# Patient Record
Sex: FEMALE | Race: WHITE | NOT HISPANIC OR LATINO | Employment: FULL TIME | URBAN - METROPOLITAN AREA
[De-identification: names, ages, dates, MRNs, and addresses within clinical notes are randomized per-mention and may not be internally consistent; named-entity substitution may affect disease eponyms.]

---

## 2022-11-10 ENCOUNTER — OFFICE VISIT (OUTPATIENT)
Dept: FAMILY MEDICINE | Facility: CLINIC | Age: 22
End: 2022-11-10

## 2022-11-10 VITALS
HEIGHT: 72 IN | OXYGEN SATURATION: 98 % | RESPIRATION RATE: 17 BRPM | WEIGHT: 176 LBS | SYSTOLIC BLOOD PRESSURE: 126 MMHG | HEART RATE: 84 BPM | DIASTOLIC BLOOD PRESSURE: 70 MMHG | TEMPERATURE: 98.2 F | BODY MASS INDEX: 23.84 KG/M2

## 2022-11-10 DIAGNOSIS — T78.40XA ALLERGIC REACTION, INITIAL ENCOUNTER: Primary | ICD-10-CM

## 2022-11-10 RX ORDER — EPINEPHRINE 0.3 MG/.3ML
0.3 INJECTION SUBCUTANEOUS PRN
Qty: 2 EACH | Refills: 0 | Status: SHIPPED | OUTPATIENT
Start: 2022-11-10

## 2022-11-10 ASSESSMENT — PAIN SCALES - GENERAL: PAINLEVEL: NO PAIN (0)

## 2022-11-11 NOTE — PATIENT INSTRUCTIONS
Patient Visit Summary    Patient Name: Fabby Cazares  MRN: [id number]    Date of Visit: 11/10/2022    Principle Diagnosis: Urticaria    Physician's Recommendations/Instructions:   1) Discontinue supergreens.   2) Continue taking over the counter medications as needed when symptoms arise, including Benedryl and Zyrtec.  3) Increase PPE use at work when possible; frequently change gloves and wear a mask.   4) Be mindful and use good judgement when using recreational drugs.     Lab Tests Performed:    Follow Up/Results:     Referrals and Instructions: Follow up with [facility] for allergy testing. Coming back for EpiPen    Physician: Dr. Gallardo

## 2022-11-11 NOTE — NURSING NOTE
"[facility] Nursing Progress Note    Primary Concern: Allergies      Pt presents to clinic with concerns about a rash that she began developing on October 16th. She does endorse lifelong allergy to pet dander that is exacerbated by her job as a . Her allergies are normally controlled by Benadryl & Zyrtec, but that changed on the 16th when she began supplementing her diet with \"Super Greens\". Since then, she has been developing a swollen, red, itchy rash on her face, arms, & the back of her neck. Her lips have swelled on two separate occassions and she experienced jaw swelling on 11/9. She believes the histamines in the \"Super Greens\" powder are counteracting her antihistamine medication.  Rash is not present today because she dc'd use of the \"Super Greens\" supplement (Last use 11/9 6am). She is interested in allergy testing to determine appropriate next steps.       Objective:  /70 (BP Location: Left arm, Patient Position: Sitting, Cuff Size: Adult Regular)   Pulse 84   Temp 98.2  F (36.8  C) (Temporal)   Resp 17   Ht 1.829 m (6')   Wt 79.8 kg (176 lb)   SpO2 98%   BMI 23.87 kg/m        Social History:  Occupation   Physical Activity/Lifestyle Active    PHQ Score:    PHQ2: 2    PHQ9: N/A    Nutrition Screener:    Q1 Answer: Often true    Q2 Answer: Often true    Referral to Nutrition needed?: Yes      Nursing Clinician: HELIO Magana  Nursing Preceptor: This patient visit was presented to the preceptors, and the plan of care was agreed upon by the team.    _____________________________  Preceptor Use Only:  In supervising the student, I have reviewed and verified the student's documentation and found it to be correct and complete.   Preceptor Signature: This patient visit was presented to the preceptors, and the plan of care was agreed upon by the team.  "

## 2022-11-11 NOTE — PROGRESS NOTES
MEDICINE NOTE    SUBJECTIVE:  Fabby Cazares is a 22 year old female with a history of pet dander allergy who presents today for evaluation of hives. Patient reports onset 3.5 weeks ago (10/16/22) of persistent, moderately pruritic, burning, painful, tingling, progressively worsening full body urticaria with associated swelling especially around her wrists. This occurred after she started taking a new Supergreens powder with added whole banana, spinach, and kale. Of note, patient has eaten the whole produce previously without issue. The rash continued to worsen and 2 days ago she noticed lip swelling and jaw swelling. Tried Benadryl, Zyrtec, and Hydrocortisone cream without relief of symptoms. She stopped taking the Supergreens 2 days ago and her symptoms have moderately improved. She states she believes the histamines in the spinach and kale are counteracting the Zyrtec and Benadryl she is taking.    Of note, the patient has a pet dander allergy and works as a . She takes Benedryl and Zyrtec PRN and has been able to manage her allergies. She also notes that after having Covid 3 years ago, she has noticed new food allergies such as pineapple and shrimp, with lip swelling. Patient reports a history of hand swelling triggered by rings.     Denies any dyspnea, wheezing, fever, chills, abdominal pain, jaundice, temperature triggered urticaria, lightheadedness, or joint pain. Denies any new soaps or detergents.    REVIEW OF SYSTEMS:  Gen: no fevers, chills  Ears, Noses, Mouth, Throat:throat clear  Cardiac: no chest pain  Lungs: no dyspnea, cough, or shortness of breath  GI: no abdominal pain  : no change in urine, hematuria, or sexual dysfunction  Musculoskeletal: no joint or muscle pain or swelling   Skin:urticaria. no concerning lesions or moles  Neuro: Tingling. No loss of sensation.  Psych: No sleep disturbance    Allergies  Pet  dander  Shrimp  Pineapple    Meds:  Benadryl  Cetirizine  Hydrocortisone  Acetaminophen    Medical History:  Urticaria secondary to pet dander allergy    Surgical History:  Breast cyst removal    Family History  Patient denies any relevant family history.    Social History     Socioeconomic History    Marital status: Not on file     Spouse name: Not on file    Number of children: Not on file    Years of education: Not on file    Highest education level: Not on file   Occupational History    Not on file   Tobacco Use    Smoking status: Not on file    Smokeless tobacco: Not on file   Substance and Sexual Activity    Alcohol use: Not on file    Drug use: Not on file    Sexual activity: Not on file   Other Topics Concern    Not on file   Social History Narrative    Not on file     Social Determinants of Health     Financial Resource Strain: Not on file   Food Insecurity: Positive screen, provided nutrition resources   Transportation Needs: Not on file   Physical Activity: Not on file   Stress: Not on file   Social Connections: Not on file   Intimate Partner Violence: Not on file   Housing Stability: Not on file       OBJECTIVE:  Physical Exam:  /70 (BP Location: Left arm, Patient Position: Sitting, Cuff Size: Adult Regular)   Pulse 84   Temp 98.2  F (36.8  C) (Temporal)   Resp 17   Ht 1.829 m (6')   Wt 79.8 kg (176 lb)   SpO2 98%   BMI 23.87 kg/m    Constitutional: no distress, comfortable, pleasant   Eyes: anicteric, normal extra-ocular movements   Ears, Nose and Throat: Carotid pulses intact with no bruits.  Cardiovascular: regular rate and rhythm, normal S1 and S2, no murmurs, rubs or gallops  Respiratory: clear to auscultation, no wheezes or crackles, normal breath sounds   Skin: mild urticaria on the left neck. no concerning lesions, no jaundice   Psychological: appropriate mood   Lymphatic: no cervical lymphadenopathy    ASSESSMENT/PLAN:  Fabby Cazares is a 22 year old female who presents today for  onset 3.5 weeks ago of persistent, pruritic, burning, painful, worsening urticaria after adding Superfoods supplement to her diet. No other new environmental exposures. Urticaria subsided with cessation of Superfoods supplement.    Counseled patient to:  1. Stop Superfoods supplement  2. Continue Benadryl and Zyrtec as needed.     Plan  1. Provided patient with EpiPen for any potential acute anaphylaxis reaction.  2. Provided referral to [facility] for immunologist appointment.  3.  Provided mental health resources and nutrition grocery box.   4. Counseled patient on potential drug interactions with recreational drug use. Patient voiced understanding.      Fabby was seen today for allergies.    Diagnoses and all orders for this visit:    Allergic reaction, initial encounter  -     EPINEPHrine (ANY BX GENERIC EQUIV) 0.3 MG/0.3ML injection 2-pack; Inject 0.3 mLs (0.3 mg) into the muscle as needed for anaphylaxis May repeat one time in 5-15 minutes if response to initial dose is inadequate.        Med Clinician: Ernestine Guaman    I am signing this for the preceptor who has been unable to sign this note in a timely manner.  The content of this note has been reviewed by the preceptor for accuracy.  I did not participate in the care of this patient.  Mitch Kelley MD - Medical Director, [facility]

## 2022-11-11 NOTE — PROGRESS NOTES
[facility] Progress Note    Chief complaint: Hives and allergic rash over the past 2-3 weeks.     Last date of full med: 10/11/22     Subjective:  Debora 22 year old female presents to clinic with reoccurring allergic reaction over the past 2 to 3 weeks. The patient reports animal dander allergy has been worsening over the past several weeks while taking a new 'Supergreens' supplement in her daily smoothies.  reports experiencing itchy and runny eyes, with hives worsening throughout her body, throughout the day. Recently she has been experiencing facial swelling including neck and lips.  reports worsening allergic symptoms when consuming banana's, pineapple, shrimp, and supplement 'Supergreens' which includes kale, spinach, bananas, other fruits and vegetables. The patient reports working as a , therefore, she is exposed to allergins throughout her day-to-day life. She reports that her allergies have increasingly gotten worse and is worried about a potential severe reaction.The patient reports a recent decrease in caffeine use, no tobacco or alcohol use, and reports using social drugs from time to time, not specifying. Patient is up to date on her vaccines and reports no other adverse reactions in the past. Patients recent  Worsening of symptoms resolved with discontinuation of supplement.      Current Outpatient Medications   Medication Sig Dispense Refill     EPINEPHrine (ANY BX GENERIC EQUIV) 0.3 MG/0.3ML injection 2-pack Inject 0.3 mLs (0.3 mg) into the muscle as needed for anaphylaxis May repeat one time in 5-15 minutes if response to initial dose is inadequate. 2 each 0         Objective:  /70 (BP Location: Left arm, Patient Position: Sitting, Cuff Size: Adult Regular)   Pulse 84   Temp 98.2  F (36.8  C) (Temporal)   Resp 17   Ht 1.829 m (6')   Wt 79.8 kg (176 lb)   SpO2 98%   BMI 23.87 kg/m       Skin:  One 3 cm diameter area on posterior right neck.  Erythematous with  several raised areas within.    Lungs:  Clear.  No wheezing. No stridor.      Assessment:     Allergic reaction: uncontrolled  DTP: Needs Additional Therapy: untreated condition   Rationale:  is not currently experiencing an allergic reaction, however, mentions worsening allergy symptoms that she attributes to taking a new 'Supergreens' supplement.      Plan:  1. Initiate Epipen as needed for emergent allergic situations.  2. Discontinue 'Supergreens' supplement.   3. Continue OTC benadryl as needed and cetirizine daily for allergies.  4. Educate patient on beneficial ways to avoid triggers and irritants. Including wearing a facemask and gloves at work as well as avoiding food triggers.   5. Educate patient on proper Epipen use.    Follow-Up:  Educated patient to follow-up with clinic if experiencing an adverse reactions to medications or new allergins and to monitor allergic reactions for safety.    Pharmacy Clinician: Lotus Lyons   Pharmacy Preceptor: Yanet Quinteros    _____________________________  Preceptor Use Only:  In supervising the medical student, Lotus Lyons, I have reviewed and verified the student's documentation and found it to be correct and complete.   Preceptor Signature: Hao Gallardo MD

## 2022-12-05 NOTE — PROCEDURES
Fabby Cazares is a 22 year old female presents today for new nutrition consultation.    Vitals:  /70 (BP Location: Left arm, Patient Position: Sitting, Cuff Size: Adult Regular)   Pulse 84   Temp 98.2  F (36.8  C) (Temporal)   Resp 17   Ht 1.829 m (6')   Wt 79.8 kg (176 lb)   SpO2 98%   BMI 23.87 kg/m    Estimated body mass index is 23.87 kg/m  as calculated from the following:    Height as of this encounter: 1.829 m (6').    Weight as of this encounter: 79.8 kg (176 lb).   Last 3 BP:   BP Readings from Last 3 Encounters:   11/10/22 126/70     History   Smoking Status     Not on file   Smokeless Tobacco     Not on file       Nutrition History  Patient is on a {1/2/3/4+:884940} diet at home.  Recall:  B: ***  L: ***  D: ***  Sn: ***  Beverages: ***  ETOH (1 drink = 12 oz beer, 5 oz wine, 1.5 oz liquor): ***  Eating out: ***    Physical Activity  ***   Patient Supplied Answers To Daily Health Questionnaire  No flowsheet data found.    LABS    LDL - (<100 mg/dL)  No results found for: LDL     HDL - (>39 mg/dL)   No results found for: HDL     Cholesterol - (<200 mg/dL)  No results found for: CHOL     Triglycerides - (<150 mg/dL)  No results found for: TRIG     BP - 126/70  Blood pressure category Systolic mm Hg (upper number) Diastolic mm Hg (lower number)   Normal Less than 120 Less than 80   Prehypertension 120-139 80-89   High Blood Pressure Stage 1 140-159 90-99   High Blood Pressure Stage 2 160 or higher 100 or higher   Hypertensive Crisis Higher than 180 Higher than 110     ANTHROPOMETRICS    Height: 182.9 cm (6')  Most Recent Weight: 79.8 kg (176 lb)    IBW: ***  BMI: Body mass index is 23.87 kg/m .  Weight History: ***  Dosing Weight: 79.8 kg (actual weight)      No name on file  [facility]

## 2022-12-05 NOTE — PROGRESS NOTES
"NUTRITION PROGRESS NOTE - ADIME    Time Spent: 15 minutes    Fabby Cazares is a 22 year old female who presents with past medical history as follows No past medical history on file.    Reason for assessment: Pt presents with concerns of a rash that developed after she began supplementing with a \"Super Green supplement\".     ASSESSMENT:    Food/Nutrition-Related History:  Pt states that she gets rashes from eating certain fruits (unspecified), and her lips may swell after consumption. Pt explains she is also allergic to pet dander. Since taking the Super green supplements, she has developed a swollen and red itchy rash. Pt explains that her lips will swell after eating certain fruits as well.       Anthropometric measures:     6' 0\"  176 lbs 0 oz    Wt Readings from Last 2 Encounters:   11/10/22 79.8 kg (176 lb)       Biochemical Data, Medical Tests and Procedures:    Last Comprehensive Metabolic Panel:  No results found for: NA, POTASSIUM, CHLORIDE, CO2, ANIONGAP, GLC, BUN, CR, GFRESTIMATED, ADELAIDE    /70 (BP Location: Left arm, Patient Position: Sitting, Cuff Size: Adult Regular)   Pulse 84   Temp 98.2  F (36.8  C) (Temporal)   Resp 17   Ht 1.829 m (6')   Wt 79.8 kg (176 lb)   SpO2 98%   BMI 23.87 kg/m      DIAGNOSIS:      Food  interaction related to food and nutrition knowledge deficit as evidenced by diet history and flares of rashes on the body.        INTERVENTION:    Nutrition Counseling: Counseled Pt on the importance of consuming fruits and vegetables in the diet in different forms, instead of continuing on with the supplement.     Those present during counseling: Terrie Parker Huda Gass    Nutrition Prescription: Discontinue with Super green supplements.    Detail of Intervention/Plan: Pt will stop taking Super Green supplements and will see an Allergist to determine what foods she is allergic to.    Understanding of diet demonstrated: Pt is understanding of demonstrated diet " moving forward.    Predicted compliance: compliant most of the time      MONITORING AND EVALUATION:    [facility] is a free student-run clinic, so timely monitoring is unlikely. If patient is able to follow-up or return to clinic, the following can be re-evaluated:    Nutrition Monitoring: Behavior or knowledge/beliefs/attitudes    NUTRITION TEAM:    Nutrition Clinician: Terrie Parker Huda Gass  Preceptor: Darby Aguirre    In supervising the nutrition student, I repeated the exam documented above. I have reviewed and verified and student's documentation.     Supervising Provider: Darby Aguirre

## 2023-02-07 ENCOUNTER — APPOINTMENT (OUTPATIENT)
Dept: GENERAL RADIOLOGY | Facility: CLINIC | Age: 23
End: 2023-02-07
Attending: PHYSICIAN ASSISTANT
Payer: COMMERCIAL

## 2023-02-07 ENCOUNTER — HOSPITAL ENCOUNTER (EMERGENCY)
Facility: CLINIC | Age: 23
Discharge: HOME | End: 2023-02-07
Admitting: PHYSICIAN ASSISTANT
Payer: COMMERCIAL

## 2023-02-07 ENCOUNTER — APPOINTMENT (OUTPATIENT)
Dept: GENERAL RADIOLOGY | Facility: CLINIC | Age: 23
End: 2023-02-07
Attending: EMERGENCY MEDICINE
Payer: COMMERCIAL

## 2023-02-07 VITALS
RESPIRATION RATE: 16 BRPM | TEMPERATURE: 99.1 F | SYSTOLIC BLOOD PRESSURE: 114 MMHG | BODY MASS INDEX: 21.67 KG/M2 | HEART RATE: 84 BPM | WEIGHT: 160 LBS | HEIGHT: 72 IN | OXYGEN SATURATION: 100 % | DIASTOLIC BLOOD PRESSURE: 66 MMHG

## 2023-02-07 DIAGNOSIS — S82.392A CLOSED FRACTURE OF POSTERIOR MALLEOLUS OF LEFT TIBIA, INITIAL ENCOUNTER: ICD-10-CM

## 2023-02-07 PROCEDURE — 73610 X-RAY EXAM OF ANKLE: CPT | Mod: LT

## 2023-02-07 PROCEDURE — 99284 EMERGENCY DEPT VISIT MOD MDM: CPT | Mod: 25 | Performed by: PHYSICIAN ASSISTANT

## 2023-02-07 PROCEDURE — 73590 X-RAY EXAM OF LOWER LEG: CPT | Mod: LT

## 2023-02-07 PROCEDURE — 73610 X-RAY EXAM OF ANKLE: CPT | Mod: 26 | Performed by: RADIOLOGY

## 2023-02-07 PROCEDURE — 27824 TREAT LOWER LEG FRACTURE: CPT | Mod: LT | Performed by: PHYSICIAN ASSISTANT

## 2023-02-07 PROCEDURE — 250N000013 HC RX MED GY IP 250 OP 250 PS 637: Performed by: EMERGENCY MEDICINE

## 2023-02-07 PROCEDURE — 99285 EMERGENCY DEPT VISIT HI MDM: CPT | Mod: 25 | Performed by: PHYSICIAN ASSISTANT

## 2023-02-07 PROCEDURE — 73590 X-RAY EXAM OF LOWER LEG: CPT | Mod: 26 | Performed by: RADIOLOGY

## 2023-02-07 PROCEDURE — 73630 X-RAY EXAM OF FOOT: CPT | Mod: 26 | Performed by: RADIOLOGY

## 2023-02-07 PROCEDURE — 27824 TREAT LOWER LEG FRACTURE: CPT | Mod: 54 | Performed by: PHYSICIAN ASSISTANT

## 2023-02-07 PROCEDURE — 73630 X-RAY EXAM OF FOOT: CPT | Mod: LT

## 2023-02-07 RX ORDER — ACETAMINOPHEN 325 MG/1
650 TABLET ORAL ONCE
Status: COMPLETED | OUTPATIENT
Start: 2023-02-07 | End: 2023-02-07

## 2023-02-07 RX ADMIN — ACETAMINOPHEN 650 MG: 325 TABLET, FILM COATED ORAL at 14:29

## 2023-02-07 ASSESSMENT — ACTIVITIES OF DAILY LIVING (ADL): ADLS_ACUITY_SCORE: 35

## 2023-02-07 NOTE — DISCHARGE INSTRUCTIONS
Here in the emergency department, we did obtain x-rays of your ankle which do show a fracture otherwise known as broken bone.  I placed a splint on your ankle here in the emergency department.  It is important that you follow-up with orthopedics within the next 1 week.  I placed a referral and they should be calling you to schedule a follow-up visit.  We discussed importance of nonweightbearing and using crutches in the meantime.  You can use Tylenol and ibuprofen for pain, along with ice and elevation of the leg to help with swelling.  Work note given.  Return to the emergency department if you develop any new or worsening symptoms.

## 2023-02-07 NOTE — ED TRIAGE NOTES
Triage Assessment & Note:    /66   Pulse 84   Temp 99.1  F (37.3  C) (Oral)   Resp 16   Ht 1.829 m (6')   Wt 72.6 kg (160 lb)   SpO2 100%   BMI 21.70 kg/m        Patient presents with: Pt comes to triage via wc from xray after falling on ice and hurting left ankle. No reports of fever, cough, SOB, CP, neck/back pain, or travel.     Home Treatments/Remedies: Home medications    Febrile / Afebrile: afebrile    Duration of C/o: < 5 hrs    Salina English RN  February 7, 2023

## 2023-02-07 NOTE — Clinical Note
Fabby Cazares was seen and treated in our emergency department on 2/7/2023.  She may return to work on 02/14/2023.  Patient is unable to bear weight on the left leg, she needs to be using crutches at all times until follow-up with orthopedics.     If you have any questions or concerns, please don't hesitate to call.      Janeth Benjamin, MAYRA

## 2023-02-07 NOTE — ED PROVIDER NOTES
"ED Provider Note  [facility]      History     Chief Complaint   Patient presents with     Ankle Pain     HPI  Fabby Cazares is a 23 year old female who presents to the emergency department this evening with concerns for left ankle pain.  Patient presents with her mother's friend.  She states that around 10 AM, patient was walking outside of her apartment.  She subsequently slipped on some ice, and fell onto her left side.  She notes that in the process, her left hip did hyperextend, and she did EVAR to her left ankle.  Patient tells me that she heard a \"snap\", and since has been unable to bear weight on the left ankle due to pain.  Pain is localized mainly to the medial aspect of the left ankle.  She does report some radiation more proximally to the proximal aspect of the lower leg.  She denies any significant knee pain, foot pain, hip pain, thigh pain.  She states she did not hit her head, no loss of consciousness.  No chest pain shortness of breath abdominal pain.  Patient is not anticoagulated.  She has not taken any medicine for the pain so far.    Past Medical History  History reviewed. No pertinent past medical history.  History reviewed. No pertinent surgical history.  EPINEPHrine (ANY BX GENERIC EQUIV) 0.3 MG/0.3ML injection 2-pack      Allergies   Allergen Reactions     Animal Dander Swelling and Rash     Minimal reaction when taking antihistamines     Banana Swelling     Pineapple Swelling     Shrimp Swelling     Family History  History reviewed. No pertinent family history.  Social History   Social History     Tobacco Use     Smoking status: Never     Smokeless tobacco: Never   Substance Use Topics     Alcohol use: Not Currently     Drug use: Not Currently         A medically appropriate review of systems was performed with pertinent positives and negatives noted in the HPI, and all other systems negative.    Physical Exam   BP: 114/66  Pulse: 84  Temp: 99.1  F (37.3 "  C)  Resp: 16  Height: 182.9 cm (6')  Weight: 72.6 kg (160 lb)  SpO2: 100 %  Physical Exam    GENERAL APPEARANCE: The patient is well developed, well appearing, and in no acute distress.  HEAD:  Normocephalic and atraumatic.   EENT: Voice normal.  NECK: No tenderness to palpation of midline cervical thoracic and lumbar spine.  LUNGS: Breath sounds are equal and clear bilaterally. No wheezes, rhonchi, or rales.  HEART: Regular rate and normal rhythm.  No tenderness to palpation along chest wall bilaterally.  ABDOMEN: Soft, flat, and benign. No mass, tenderness, guarding, or rebound.Bowel sounds are present.  EXTREMITIES: Examination of the left lower extremity reveals focal edema about the left lateral malleoli region.  Patient has intact range of motion digits 1 through 5 of the left foot.  She is unable to move her left ankle due to pain.  Palpation of the lower extremity this is no tenderness to palpation along the metatarsals, calcaneus region, or midfoot.  Patient has noted focal tenderness to palpation about the anterior aspect of the medial malleolus on the left side, along with the anterior and posterior aspects of the lateral malleolus.  No tenderness to palpation of the remainder of the distal leg including calf anterior shin, left knee.  No tibial plateau tenderness.  No left knee tenderness.  NEUROLOGIC: No focal sensory or motor deficits are noted.  Gross sensation intact distal aspect left lower extremity.  PSYCHIATRIC: The patient is awake, alert.  Appropriate mood and affect.  SKIN: Warm, dry, and well perfused. Good turgor.    ED Course, Procedures, & Data      Authorized by: SELF  Consent: Verbal consent obtained.  Consent given by: patient  Patient understanding: patient states understanding of the procedure being performed  Patient consent: the patient's understanding of the procedure matches consent given  Patient identity confirmed: verbally with patient and arm band  Location details: Left  lower extremity posterior short leg splint with Ortho-Glass  Post-procedure: The splinted body part was neurovascularly unchanged following the procedure.  Patient tolerance: Patient tolerated the procedure well with no immediate complications.    Results for orders placed or performed during the hospital encounter of 02/07/23   XR Ankle Left G/E 3 Views     Status: None    Narrative    EXAM: XR ANKLE LEFT G/E 3 VIEWS, XR FOOT LEFT G/E 3 VIEWS  2/7/2023  1:49 PM      HISTORY: fall, pain    COMPARISON: None    FINDINGS: 3 nonweightbearing views of the left ankle. 3  nonweightbearing views of the left foot.    Nondisplaced fracture of the posterior malleolus extending to the  tibiotalar joint at the posterior tibial plafond    Ankle mortise and syndesmosis appear congruent on this nonweight  bearing study. Lateral greater than medial ankle soft tissue swelling.    Lisfranc joint appears congruent on this nonweight bearing study. Os  peroneus. Type II os navicularis.       Impression    IMPRESSION: Nondisplaced fracture of the posterior malleolus. Consider  tib-fib radiographs for evaluation of more proximal fibular fracture.         MORGAN CLEANING MD (Joe)         SYSTEM ID:  [id number]   Foot XR, G/E 3 views, left     Status: None    Narrative    EXAM: XR ANKLE LEFT G/E 3 VIEWS, XR FOOT LEFT G/E 3 VIEWS  2/7/2023  1:49 PM      HISTORY: fall, pain    COMPARISON: None    FINDINGS: 3 nonweightbearing views of the left ankle. 3  nonweightbearing views of the left foot.    Nondisplaced fracture of the posterior malleolus extending to the  tibiotalar joint at the posterior tibial plafond    Ankle mortise and syndesmosis appear congruent on this nonweight  bearing study. Lateral greater than medial ankle soft tissue swelling.    Lisfranc joint appears congruent on this nonweight bearing study. Os  peroneus. Type II os navicularis.       Impression    IMPRESSION: Nondisplaced fracture of the posterior malleolus.  Consider  tib-fib radiographs for evaluation of more proximal fibular fracture.         MORGAN CLEANING MD (Joe)         SYSTEM ID:  [id number]   XR Tibia and Fibula Left 2 Views     Status: None    Narrative    2 views left tibia/fibula radiographs 2/7/2023 3:24 PM    History: posterior malleolus fracture, further evaluate for proximal  involvement    Comparison: Same day ankle and foot radiographs    Findings:    AP and lateral views of the left tibia/fibula were obtained.     No acute osseous abnormality.      Knee and ankle joints are incompletely assessed. Redemonstration  posterior malleolar fracture.    Marked soft tissue swelling overlying lateral malleolus.      Impression    Impression:  1. No proximal tibial/fibular fracture.  2. Redemonstration posterior malleolar fracture.    Individual Digital         SYSTEM ID:  [id number]     Medications   acetaminophen (TYLENOL) tablet 650 mg (650 mg Oral Given 2/7/23 1429)     Labs Ordered and Resulted from Time of ED Arrival to Time of ED Departure - No data to display  XR Tibia and Fibula Left 2 Views   Final Result   Impression:   1. No proximal tibial/fibular fracture.   2. Redemonstration posterior malleolar fracture.      Individual Digital            SYSTEM ID:  [id number]      Foot XR, G/E 3 views, left   Final Result   IMPRESSION: Nondisplaced fracture of the posterior malleolus. Consider   tib-fib radiographs for evaluation of more proximal fibular fracture.             MORGAN CLEANING MD (Joe)            SYSTEM ID:  [id number]      XR Ankle Left G/E 3 Views   Final Result   IMPRESSION: Nondisplaced fracture of the posterior malleolus. Consider   tib-fib radiographs for evaluation of more proximal fibular fracture.             MORGAN CLEANING MD (Joe)            SYSTEM ID:  [id number]             Medical Decision Making  The patient's presentation is strongly suggestive of an acute complicated injury.    The patient's evaluation involved:  ordering  and/or review of 3+ test(s) in this encounter (see separate area of note for details)    The patient's management involved a decision regarding minor procedure/surgery with identified risk factors.      Assessment & Plan    This is a 23-year-old female presenting with concerns for left ankle pain after falling on the ankle earlier in the morning.  On presentation patient has vital signs within normal limits without tachycardia or tachypnea.  Physical exam shows obvious left-sided lateral malleolar swelling, with reproducible tenderness to palpation over both aspects of the ankle.  Patient otherwise does have gross sensation intact to her left lower extremity, although is hesitant to move the ankle due to pain.  She otherwise has no tenderness to palpation of the foot, shin, knee, remainder of the left lower extremity.  She replate no other pain or injury from her fall.  Patient initially had x-rays obtained from triage with foot and ankle x-ray.  These were reviewed by myself and do show nondisplaced fracture of the posterior malleolus, without any obvious foot fractures.  Subsequent tibia-fibula x-ray was obtained and returns negative for proximal involvement.  With these findings, I did place the patient's ankle in a posterior short leg splint.  She was made nonweightbearing, and given crutches in the ED.  We discussed medication for pain and she does admit that she has had a problem with narcotics in the past and does not wish to use these.  We discussed use of Tylenol ibuprofen rest, ice, and I did give her a work note as she is required to be on her feet for work.  Referral to orthopedics was placed.  We discussed reasons to return, expectations of them to call her shortly.  Patient has no other questions or concerns at this time.  Red flag signs were addressed, and they were in agreement with the patient care plan provided.    I have reviewed the nursing notes. I have reviewed the findings, diagnosis, plan and  need for follow up with the patient.    Discharge Medication List as of 2/7/2023  4:26 PM          Final diagnoses:   Closed fracture of posterior malleolus of left tibia, initial encounter       SARIKA Pretty  [facility] EMERGENCY DEPARTMENT  2/7/2023

## 2023-02-08 ENCOUNTER — TELEPHONE (OUTPATIENT)
Dept: ORTHOPEDICS | Facility: CLINIC | Age: 23
End: 2023-02-08
Payer: COMMERCIAL

## 2023-02-08 NOTE — TELEPHONE ENCOUNTER
Orthopedic/Sports Medicine Fracture Triage    Incoming call/or message from orders pager.    Fracture type: Ankle.    The patient is in a  splint.    Date of injury 2/7/23.    Triaged by: Dr. Hicks.    Determined to be managed Non operatively.    Needs to be seen within 1 week.    Additional Comments/information: Encounter forward to clinic coordinator to schedule pt with non-op provider.         Jose Cotter, ATC

## 2023-02-09 NOTE — TELEPHONE ENCOUNTER
DIAGNOSIS: left ankle   APPOINTMENT DATE: 2.14.23   NOTES STATUS DETAILS   DISCHARGE REPORT from the ER Internal 2.7.23 Cassidy HODGE PA-C    MEDICATION LIST Internal    XRAYS (IMAGES & REPORTS) Internal 2.7.23 L tib/fib  2.7.23 L foot  2.7.23 L ankle

## 2023-02-10 DIAGNOSIS — M25.572 LEFT ANKLE PAIN: Primary | ICD-10-CM

## 2023-02-12 ENCOUNTER — HEALTH MAINTENANCE LETTER (OUTPATIENT)
Age: 23
End: 2023-02-12

## 2023-02-14 ENCOUNTER — OFFICE VISIT (OUTPATIENT)
Dept: ORTHOPEDICS | Facility: CLINIC | Age: 23
End: 2023-02-14
Payer: COMMERCIAL

## 2023-02-14 ENCOUNTER — ANCILLARY PROCEDURE (OUTPATIENT)
Dept: GENERAL RADIOLOGY | Facility: CLINIC | Age: 23
End: 2023-02-14
Attending: FAMILY MEDICINE
Payer: COMMERCIAL

## 2023-02-14 ENCOUNTER — PRE VISIT (OUTPATIENT)
Dept: ORTHOPEDICS | Facility: CLINIC | Age: 23
End: 2023-02-14

## 2023-02-14 DIAGNOSIS — M25.572 LEFT ANKLE PAIN: ICD-10-CM

## 2023-02-14 DIAGNOSIS — S82.302A CLOSED FRACTURE OF DISTAL END OF LEFT TIBIA, UNSPECIFIED FRACTURE MORPHOLOGY, INITIAL ENCOUNTER: Primary | ICD-10-CM

## 2023-02-14 PROCEDURE — 73610 X-RAY EXAM OF ANKLE: CPT | Mod: LT | Performed by: RADIOLOGY

## 2023-02-14 PROCEDURE — 99203 OFFICE O/P NEW LOW 30 MIN: CPT | Performed by: FAMILY MEDICINE

## 2023-02-14 NOTE — LETTER
Date:February 15, 2023      Patient was self referred, no letter generated. Do not send.        [facility] Health Information

## 2023-02-14 NOTE — LETTER
2023    Fabby Cazares  [address]  [facility] [facility] [place] [address]  [phone] (home)     :     2000      To Whom it May Concern:    This 23-year-old female was seen in clinic today for a left-sided ankle fracture that occurred 2023.  She will need to be nonweightbearing, with crutches, for the next 4 weeks.  She will have a follow-up visit in a week in clinic for repeat x-rays.      Sincerely,        Abdiaziz De La Rosa MD

## 2023-02-14 NOTE — PROGRESS NOTES
"Sports Medicine Clinic Visit    PCP: No Ref-Primary, Physician    Fabby Cazares is a 23 year old female who is seen  as self referral presenting with left posterior malleolus fx     Injury: Pt slipped on ice    Location of Pain: left medial ankle  Duration of Pain: 7 day(s)  Rating of Pain: 6/10  Pain is better with: crutches, aspirin, tylenol, ibuprofen, ice, elevation  Pain is worse with: weight bearing  Additional Features: swelling, bruising  Treatment so far consists of: Tylenol, crutches, aspirin, ibuprofen, ice, elvation  Prior History of related problems: previous ankle sprain    There were no vitals taken for this visit.Left ankle pain      Patient slipped on ice walking outside of her apartment 2/7/2023, 1 week ago.  She could hear a \"snap\" with left ankle pain and was unable to bear weight on the left ankle.  In the emergency room 2/7/2023 she was found to have a posterior malleolar fracture of the left ankle, with no associated fractures noted.  The mortise appeared intact.  An additional x-ray of the tibia and fibula showed no proximal fractures.  Patient was placed in a posterior short leg splint, nonweightbearing with crutches.    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  She enjoys time in a weight room in multiple sports.      Imaging studies below reviewed by me:  XR Ankle Left G/E 3 Views     Status: None     Narrative     EXAM: XR ANKLE LEFT G/E 3 VIEWS, XR FOOT LEFT G/E 3 VIEWS  2/7/2023  1:49 PM       HISTORY: fall, pain     COMPARISON: None     FINDINGS: 3 nonweightbearing views of the left ankle. 3  nonweightbearing views of the left foot.     Nondisplaced fracture of the posterior malleolus extending to the  tibiotalar joint at the posterior tibial plafond     Ankle mortise and syndesmosis appear congruent on this nonweight  bearing study. Lateral greater than medial ankle soft tissue swelling.     Lisfranc joint appears congruent on this nonweight bearing study. Os  peroneus. " Type II os navicularis.         Impression     IMPRESSION: Nondisplaced fracture of the posterior malleolus. Consider  tib-fib radiographs for evaluation of more proximal fibular fracture.           MORGAN CLEANING MD (Joe)         SYSTEM ID:  [id number]   Foot XR, G/E 3 views, left     Status: None     Narrative     EXAM: XR ANKLE LEFT G/E 3 VIEWS, XR FOOT LEFT G/E 3 VIEWS  2/7/2023  1:49 PM       HISTORY: fall, pain     COMPARISON: None     FINDINGS: 3 nonweightbearing views of the left ankle. 3  nonweightbearing views of the left foot.     Nondisplaced fracture of the posterior malleolus extending to the  tibiotalar joint at the posterior tibial plafond     Ankle mortise and syndesmosis appear congruent on this nonweight  bearing study. Lateral greater than medial ankle soft tissue swelling.     Lisfranc joint appears congruent on this nonweight bearing study. Os  peroneus. Type II os navicularis.         Impression     IMPRESSION: Nondisplaced fracture of the posterior malleolus. Consider  tib-fib radiographs for evaluation of more proximal fibular fracture.           MORGAN CLEANING MD (Joe)         SYSTEM ID:  [id number]   XR Tibia and Fibula Left 2 Views     Status: None     Narrative     2 views left tibia/fibula radiographs 2/7/2023 3:24 PM     History: posterior malleolus fracture, further evaluate for proximal  involvement     Comparison: Same day ankle and foot radiographs     Findings:     AP and lateral views of the left tibia/fibula were obtained.      No acute osseous abnormality.       Knee and ankle joints are incompletely assessed. Redemonstration  posterior malleolar fracture.     Marked soft tissue swelling overlying lateral malleolus.        Impression     Impression:  1. No proximal tibial/fibular fracture.  2. Redemonstration posterior malleolar fracture.     MARCO A REGAN              PMH:  No past medical history on file.    Active problem list:  There is no problem list on file  for this patient.      FH:  No family history on file.    SH:  Social History     Socioeconomic History     Marital status: Single     Spouse name: Not on file     Number of children: Not on file     Years of education: Not on file     Highest education level: Not on file   Occupational History     Not on file   Tobacco Use     Smoking status: Never     Smokeless tobacco: Never   Substance and Sexual Activity     Alcohol use: Not Currently     Drug use: Not Currently     Sexual activity: Not on file   Other Topics Concern     Not on file   Social History Narrative    11/10/22- Requested counseling/ therapy services, food pantries, and allergy testing. [facility] provided a handout for the walk-in counseling center ([address] [place] [address]) for free zoom counseling appointments. Provided a handout for [facility] to call for allergy testing. CHWs discussed the patient's request for food pantries with nutrition, and they provided a grocery checklist to the patient.    RICHY MEDELLIN     Social Determinants of Health     Financial Resource Strain: Not on file   Food Insecurity: Not on file   Transportation Needs: Not on file   Physical Activity: Not on file   Stress: Not on file   Social Connections: Not on file   Intimate Partner Violence: Not on file   Housing Stability: Not on file       MEDS:  See EMR, reviewed  ALL:  See EMR, reviewed    REVIEW OF SYSTEMS:  CONSTITUTIONAL:NEGATIVE for fever, chills, change in weight  INTEGUMENTARY/SKIN: NEGATIVE for worrisome rashes, moles or lesions  EYES: NEGATIVE for vision changes or irritation  ENT/MOUTH: NEGATIVE for ear, mouth and throat problems  RESP:NEGATIVE for significant cough or SOB  BREAST: NEGATIVE for masses, tenderness or discharge  CV: NEGATIVE for chest pain, palpitations or peripheral edema  GI: NEGATIVE for nausea, abdominal pain, heartburn, or change in bowel habits  :NEGATIVE for frequency, dysuria, or hematuria  :NEGATIVE for frequency, dysuria, or  hematuria  NEURO: NEGATIVE for weakness, dizziness or paresthesias  ENDOCRINE: NEGATIVE for temperature intolerance, skin/hair changes  HEME/ALLERGY/IMMUNE: NEGATIVE for bleeding problems  PSYCHIATRIC: NEGATIVE for changes in mood or affect      Objective: Nontender at the proximal fibula at the knee.  Some bruising over the anterior tibia but skin is intact.  Tender along the course of the distal fibula, anterior talofibular ligament, calcaneofibular ligament and posterior talofibular ligament.  Tender over the area of the deltoid.  Tender over the bony distal tibia.  Nontender over the Achilles tendon and no Achilles tendon defects are palpated.  Nontender at the navicular, nontender at the area of Lisfranc, nontender over the proximal fifth metatarsal.  She will dorsiflex and volar flex against resistance with the ankle.  Sensation is intact distally.  Appropriate in conversation and affect.    Personally reviewed with the patient updated x-rays, outside her splint from the ER, that show an isolated posterior malleolar fracture.  I do not see any associated fractures about the ankle and the mortise is intact.    Assessment: Nondisplaced isolated posterior malleolar fracture, left ankle, x7 days    Plan: She was given a cam walker boot and compression wrap.  She will be nonweightbearing for the next 4 weeks.  She has crutches available.  Follow-up in 1 week for repeat x-rays outside of the boot.  If her x-ray is stable her next follow-up after that would be 3 weeks later.  Tylenol as needed for pain.  She had no further questions.  Note provided for work.        DME FITTING    Relevant Diagnosis: Posterior malleolus fx, lft  Tall walking boot, medium was fit on patient's Left ankle.     Person(s) involved in teaching:   Patient    Brace was applied in standard Manner:  Yes  Brace fit well:  Yes  Patient reports brace to fit comfortably:  Yes    Education:   Patient shown self application and removal of brace:  Yes  Patient shown how to adjust brace fit, if necessary: Yes  Patient educated on billing and return policy: Yes  Patient confirmed understanding when and how to contact clinic with concerns: Yes      Davotne Souza ATC on 2/14/2023 at 9:49 AM

## 2023-02-14 NOTE — LETTER
"  2/14/2023      RE: Fabby Cazares  [address] [facility] [address]     Dear Colleague,    Thank you for referring your patient, Fabby Cazares, to the [facility] [place]. Please see a copy of my visit note below.    Sports Medicine Clinic Visit    PCP: No Ref-Primary, Physician    Fabby Cazares is a 23 year old female who is seen  as self referral presenting with left posterior malleolus fx     Injury: Pt slipped on ice    Location of Pain: left medial ankle  Duration of Pain: 7 day(s)  Rating of Pain: 6/10  Pain is better with: crutches, aspirin, tylenol, ibuprofen, ice, elevation  Pain is worse with: weight bearing  Additional Features: swelling, bruising  Treatment so far consists of: Tylenol, crutches, aspirin, ibuprofen, ice, elvation  Prior History of related problems: previous ankle sprain    There were no vitals taken for this visit.Left ankle pain      Patient slipped on ice walking outside of her apartment 2/7/2023, 1 week ago.  She could hear a \"snap\" with left ankle pain and was unable to bear weight on the left ankle.  In the emergency room 2/7/2023 she was found to have a posterior malleolar fracture of the left ankle, with no associated fractures noted.  The mortise appeared intact.  An additional x-ray of the tibia and fibula showed no proximal fractures.  Patient was placed in a posterior short leg splint, nonweightbearing with crutches.    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  She enjoys time in a weight room in multiple sports.      Imaging studies below reviewed by me:  XR Ankle Left G/E 3 Views     Status: None     Narrative     EXAM: XR ANKLE LEFT G/E 3 VIEWS, XR FOOT LEFT G/E 3 VIEWS  2/7/2023  1:49 PM       HISTORY: fall, pain     COMPARISON: None     FINDINGS: 3 nonweightbearing views of the left ankle. 3  nonweightbearing views of the left foot.     Nondisplaced fracture of the posterior malleolus extending to the  tibiotalar " joint at the posterior tibial plafond     Ankle mortise and syndesmosis appear congruent on this nonweight  bearing study. Lateral greater than medial ankle soft tissue swelling.     Lisfranc joint appears congruent on this nonweight bearing study. Os  peroneus. Type II os navicularis.         Impression     IMPRESSION: Nondisplaced fracture of the posterior malleolus. Consider  tib-fib radiographs for evaluation of more proximal fibular fracture.           MORGAN CLEANING MD (Joe)         SYSTEM ID:  [id number]   Foot XR, G/E 3 views, left     Status: None     Narrative     EXAM: XR ANKLE LEFT G/E 3 VIEWS, XR FOOT LEFT G/E 3 VIEWS  2/7/2023  1:49 PM       HISTORY: fall, pain     COMPARISON: None     FINDINGS: 3 nonweightbearing views of the left ankle. 3  nonweightbearing views of the left foot.     Nondisplaced fracture of the posterior malleolus extending to the  tibiotalar joint at the posterior tibial plafond     Ankle mortise and syndesmosis appear congruent on this nonweight  bearing study. Lateral greater than medial ankle soft tissue swelling.     Lisfranc joint appears congruent on this nonweight bearing study. Os  peroneus. Type II os navicularis.         Impression     IMPRESSION: Nondisplaced fracture of the posterior malleolus. Consider  tib-fib radiographs for evaluation of more proximal fibular fracture.           MORGAN CLEANING MD (Joe)         SYSTEM ID:  [id number]   XR Tibia and Fibula Left 2 Views     Status: None     Narrative     2 views left tibia/fibula radiographs 2/7/2023 3:24 PM     History: posterior malleolus fracture, further evaluate for proximal  involvement     Comparison: Same day ankle and foot radiographs     Findings:     AP and lateral views of the left tibia/fibula were obtained.      No acute osseous abnormality.       Knee and ankle joints are incompletely assessed. Redemonstration  posterior malleolar fracture.     Marked soft tissue swelling overlying lateral  malleolus.        Impression     Impression:  1. No proximal tibial/fibular fracture.  2. Redemonstration posterior malleolar fracture.     MARCO A SHEREEN              PMH:  No past medical history on file.    Active problem list:  There is no problem list on file for this patient.      FH:  No family history on file.    SH:  Social History     Socioeconomic History     Marital status: Single     Spouse name: Not on file     Number of children: Not on file     Years of education: Not on file     Highest education level: Not on file   Occupational History     Not on file   Tobacco Use     Smoking status: Never     Smokeless tobacco: Never   Substance and Sexual Activity     Alcohol use: Not Currently     Drug use: Not Currently     Sexual activity: Not on file   Other Topics Concern     Not on file   Social History Narrative    11/10/22- Requested counseling/ therapy services, food pantries, and allergy testing. [facility] provided a handout for the walk-in counseling center ([address] [place] [address]) for free zoom counseling appointments. Provided a handout for [facility] to call for allergy testing. CHWs discussed the patient's request for food pantries with nutrition, and they provided a grocery checklist to the patient.    RICHY MEDELLIN     Social Determinants of Health     Financial Resource Strain: Not on file   Food Insecurity: Not on file   Transportation Needs: Not on file   Physical Activity: Not on file   Stress: Not on file   Social Connections: Not on file   Intimate Partner Violence: Not on file   Housing Stability: Not on file       MEDS:  See EMR, reviewed  ALL:  See EMR, reviewed    REVIEW OF SYSTEMS:  CONSTITUTIONAL:NEGATIVE for fever, chills, change in weight  INTEGUMENTARY/SKIN: NEGATIVE for worrisome rashes, moles or lesions  EYES: NEGATIVE for vision changes or irritation  ENT/MOUTH: NEGATIVE for ear, mouth and throat problems  RESP:NEGATIVE for significant cough or SOB  BREAST: NEGATIVE for masses,  tenderness or discharge  CV: NEGATIVE for chest pain, palpitations or peripheral edema  GI: NEGATIVE for nausea, abdominal pain, heartburn, or change in bowel habits  :NEGATIVE for frequency, dysuria, or hematuria  :NEGATIVE for frequency, dysuria, or hematuria  NEURO: NEGATIVE for weakness, dizziness or paresthesias  ENDOCRINE: NEGATIVE for temperature intolerance, skin/hair changes  HEME/ALLERGY/IMMUNE: NEGATIVE for bleeding problems  PSYCHIATRIC: NEGATIVE for changes in mood or affect      Objective: Nontender at the proximal fibula at the knee.  Some bruising over the anterior tibia but skin is intact.  Tender along the course of the distal fibula, anterior talofibular ligament, calcaneofibular ligament and posterior talofibular ligament.  Tender over the area of the deltoid.  Tender over the bony distal tibia.  Nontender over the Achilles tendon and no Achilles tendon defects are palpated.  Nontender at the navicular, nontender at the area of Lisfranc, nontender over the proximal fifth metatarsal.  She will dorsiflex and volar flex against resistance with the ankle.  Sensation is intact distally.  Appropriate in conversation and affect.    Personally reviewed with the patient updated x-rays, outside her splint from the ER, that show an isolated posterior malleolar fracture.  I do not see any associated fractures about the ankle and the mortise is intact.    Assessment: Nondisplaced isolated posterior malleolar fracture, left ankle, x7 days    Plan: She was given a cam walker boot and compression wrap.  She will be nonweightbearing for the next 4 weeks.  She has crutches available.  Follow-up in 1 week for repeat x-rays outside of the boot.  If her x-ray is stable her next follow-up after that would be 3 weeks later.  Tylenol as needed for pain.  She had no further questions.  Note provided for work.        DME FITTING    Relevant Diagnosis: Posterior malleolus fx, lft  Tall walking boot, medium was fit on  patient's Left ankle.     Person(s) involved in teaching:   Patient    Brace was applied in standard Manner:  Yes  Brace fit well:  Yes  Patient reports brace to fit comfortably:  Yes    Education:   Patient shown self application and removal of brace: Yes  Patient shown how to adjust brace fit, if necessary: Yes  Patient educated on billing and return policy: Yes  Patient confirmed understanding when and how to contact clinic with concerns: Yes      Davonte Souza ATC on 2/14/2023 at 9:49 AM                    Again, thank you for allowing me to participate in the care of your patient.      Sincerely,    Abdiaziz De La Rosa MD

## 2023-02-27 DIAGNOSIS — S82.302A CLOSED FRACTURE OF DISTAL END OF LEFT TIBIA, UNSPECIFIED FRACTURE MORPHOLOGY, INITIAL ENCOUNTER: Primary | ICD-10-CM

## 2023-02-28 ENCOUNTER — OFFICE VISIT (OUTPATIENT)
Dept: ORTHOPEDICS | Facility: CLINIC | Age: 23
End: 2023-02-28
Payer: COMMERCIAL

## 2023-02-28 ENCOUNTER — ANCILLARY PROCEDURE (OUTPATIENT)
Dept: GENERAL RADIOLOGY | Facility: CLINIC | Age: 23
End: 2023-02-28
Attending: FAMILY MEDICINE
Payer: COMMERCIAL

## 2023-02-28 VITALS — BODY MASS INDEX: 21.67 KG/M2 | HEIGHT: 72 IN | WEIGHT: 160 LBS

## 2023-02-28 DIAGNOSIS — S82.302A CLOSED FRACTURE OF DISTAL END OF LEFT TIBIA, UNSPECIFIED FRACTURE MORPHOLOGY, INITIAL ENCOUNTER: ICD-10-CM

## 2023-02-28 DIAGNOSIS — S82.302D CLOSED FRACTURE OF DISTAL END OF LEFT TIBIA WITH ROUTINE HEALING, UNSPECIFIED FRACTURE MORPHOLOGY, SUBSEQUENT ENCOUNTER: Primary | ICD-10-CM

## 2023-02-28 PROCEDURE — 73610 X-RAY EXAM OF ANKLE: CPT | Mod: LT | Performed by: RADIOLOGY

## 2023-02-28 PROCEDURE — 99214 OFFICE O/P EST MOD 30 MIN: CPT | Performed by: FAMILY MEDICINE

## 2023-02-28 NOTE — LETTER
Return to Work  2023     Seen today: Yes    Patient:  Fabby Cazares  :   2000  MRN:     [id number]  Physician: CHIVO CAGLE    To whom it may concern:      Fabby Cazares is under my professional care following an ankle injury. At this time she is able to return to work but she will need to be non-weightbearing for the next 3 weeks. Please allow her to opportunity to sit, stand, rest and change positions as needed. At her follow up appointment on 2023 her work restrictions will be reassessed. Please contact my office with any questions or concerns.         Electronically signed by Chivo Cagle DO

## 2023-02-28 NOTE — PATIENT INSTRUCTIONS
WHAT IS AN ACHILLES TENDON INJURY?      An Achilles tendon injury is a problem with the tendon that connects your heel bone to the calf muscle of your lower leg. Tendons are strong bands of tissue that attach muscle to bone. You use the Achilles tendon when you point your toes up and down and when you walk, run, or jump.    Tendons can be injured suddenly or they may be slowly damaged over time. You can have tiny or partial tears in your tendon. If you have a complete tear of your tendon, it s called a rupture. Other tendon injuries may be called a strain, tendinosis, or tendonitis.    WHAT IS THE CAUSE?    Achilles tendon injuries can be caused by:    Overuse of the tendon, such as from lots of uphill running, intense exercise, or sports training or from doing a lot of work that causes you to bend at the knees and ankles  A sudden activity that twists or tears your tendon, such as jumping, starting to sprint, or falling    You are more likely to have an Achilles tendon problem if you:    Have tight calf muscles or a tight Achilles tendon  Change the type of running shoes you wear, or if you wear high heels most of the day and then switch to lower heeled shoes for exercise  Have a problem called over-pronation, which happens when your feet roll inward and flatten out more than normal when you walk or run  WHAT ARE THE SYMPTOMS?    Symptoms may include:    Pain, stiffness, weakness, or swelling in the back of your lower leg  Pain in the back of your leg or ankle when you rise up on your toes  Trouble moving your ankle in different directions  If the tendon is completely torn, you may have felt a pop at the time of the injury. You may not be able to lift your heel off the ground or point your toes.    HOW IS IT DIAGNOSED?    Your healthcare provider will ask about your symptoms, activities, and medical history and examine you. Your provider may ask to watch you walk or run to see if your feet flatten more than normal.  You may have tests such as X-rays or other scans.    HOW IS IT TREATED?    You will need to change or stop doing the activities that cause pain until your tendon has healed. For example, you may need to swim instead of run.    Your healthcare provider may recommend stretching and strengthening exercises to help you heal.    Special shoes or shoe inserts may help. If you have a severe injury, your healthcare provider may put your foot in a cast or splint for several weeks to keep it from moving while it heals.    If your tendon is torn, you may need surgery to repair the tendon.    The pain often gets better within a few weeks with self-care, but some injuries may take several months or longer to heal. It s important to follow all of your healthcare provider s instructions.    HOW CAN I TAKE CARE OF MYSELF?    To keep swelling down and help relieve pain:    Put an ice pack, gel pack, or package of frozen vegetables wrapped in a cloth on the injured area every 3 to 4 hours for up to 20 minutes at a time.  Do ice massage. To do this, freeze water in a Styrofoam cup, then peel the top of the cup away to expose the ice. Hold the bottom of the cup and rub the ice over the painful area for 5 to 10 minutes. Do this several times a day while you have pain.  Keep your foot up on pillows when you sit or lie down.    Take nonprescription pain medicine, such as acetaminophen, ibuprofen, or naproxen. Nonsteroidal anti-inflammatory medicines (NSAIDs), such as ibuprofen and naproxen, may cause stomach bleeding and other problems. These risks increase with age. Read the label and take as directed. Unless recommended by your healthcare provider, you should not take this medicine for more than 10 days.    Moist heat may help relieve pain, relax your muscles, and make it easier to use your ankle. Put moist heat on the injured area for 10 to 15 minutes before you do warm-up and stretching exercises. Moist heat includes heat patches or  moist heating pads that you can buy at most drugstores, a warm, wet washcloth, or a hot shower. To prevent burns to your skin, follow directions on the package and do not lie on any type of hot pad. Don t use heat if you have swelling.    HOW CAN I HELP PREVENT AN ACHILLES TENDON PROBLEM?     Warm-up exercises and stretching before activities can help prevent injuries. If you have tight Achilles tendons or calf muscles, stretch them twice a day whether or not you are doing any activities that day. If your leg or ankle hurts after exercise, putting ice on it may help keep it from getting injured.    Avoid running uphill if you tend to have Achilles tendon injuries.    Follow the safety rules and use the protective equipment recommended for your work or sport.    Achilles Tendonitis Exercises    You can do the towel stretch right away. When the towel stretch is easy, try the standing calf stretch, soleus stretch, and leg lift. When you no longer have sharp pain in your calf or tendon, you can do the step-up, heel raises, and static and balance and reach exercises.    Towel stretch: Sit on a hard surface with your injured leg stretched out in front of you. Loop a towel around your toes and the ball of your foot and pull the towel toward your body keeping your leg straight. Hold this position for 15 to 30 seconds and then relax. Repeat 3 times.    Standing calf stretch: Stand facing a wall with your hands on the wall at about eye level. Keep your injured leg back with your heel on the floor. Keep the other leg forward with the knee bent. Turn your back foot slightly inward (as if you were pigeon-toed). Slowly lean into the wall until you feel a stretch in the back of your calf. Hold the stretch for 15 to 30 seconds. Return to the starting position. Repeat 3 times. Do this exercise several times each day.    Standing soleus stretch: Stand facing a wall with your hands on the wall at about chest height. Keep your injured  leg back with your heel on the floor. Keep the other leg forward with the knee bent. Turn your back foot slightly inward (as if you were pigeon-toed). Bend your back knee slightly and gently lean into the wall until you feel a stretch in the lower calf of your injured leg. Hold the stretch for 15 to 30 seconds. Return to the starting position. Repeat 3 times.    Side-lying leg lift: Lie on your uninjured side. Tighten the front thigh muscles on your injured leg and lift that leg 8 to 10 inches (20 to 25 centimeters) away from the other leg. Keep the leg straight and lower it slowly. Do 2 sets of 15.    Step-up: Stand with the foot of your injured leg on a support 3 to 5 inches (8 to 13 centimeters) high --like a small step or block of wood. Keep your other foot flat on the floor. Shift your weight onto the injured leg on the support. Straighten your injured leg as the other leg comes off the floor. Return to the starting position by bending your injured leg and slowly lowering your uninjured leg back to the floor. Do 2 sets of 15.    Eccentric calf strengthening: Stand behind a chair or counter with your feet flat on the floor. Using the chair or counter as a support to help you, raise your body up onto your toes and hold for 5 seconds. Then slowly lower yourself down with your injured leg only. (It's OK to keep holding onto the support if you need to.) Repeat 15 times. Do 2 sets of 15. Rest 30 seconds between sets.    Single leg balance exercises: Stand next to a chair with your injured leg farther from the chair. The chair will provide support if you need it. Stand on the foot of your injured leg and bend your knee slightly. Try to raise the arch of this foot while keeping your big toe on the floor. Keep your foot in this position.    While keeping your arch raised, reach the hand that is farther away from the chair across your body toward the chair. The farther you reach, the more challenging the exercise. Do 2  sets of 15.    Published by [employer].  Copyright  2014 [employer] and/or one of its subsidiaries. All rights reserved.

## 2023-02-28 NOTE — PROGRESS NOTES
CHIEF COMPLAINT:  RECHECK (Left ankle fx)       HISTORY OF PRESENT ILLNESS  Ms. Cazares is a pleasant 23 year old year old female who presents to clinic today with a left ankle fracture.  Fabby explains that she slipped on ice and injured her left ankle.    Onset: sudden  Location: left ankle  Quality:  dull  Duration: 2/7/23  Severity: 8/10 at worst  Timing:constant  Modifying factors:  resting and non-use makes it better, movement and use makes it worse  Associated signs & symptoms: pain, numbness and tingling on top of foot, swelling  Previous similar pain: No  Treatments to date: Tylenol, cam boot, crutches, compression sock, ice    Additional history: as documented    Review of Systems:     Have you recently had a a fever, chills, weight loss? Yes, weight loss    Do you have any vision problems? No    Do you have any chest pain or edema? No    Do you have any shortness of breath or wheezing?  No    Do you have stomach problems? No    Do you have any numbness or focal weakness? Yes, right arm occasionally    Do you have diabetes? No    Do you have problems with bleeding or clotting? Yes, blood thinners previously and previous DVT after surgery 8 years ago    Do you have an rashes or other skin lesions? No    MEDICAL HISTORY  There is no problem list on file for this patient.      Current Outpatient Medications   Medication Sig Dispense Refill     EPINEPHrine (ANY BX GENERIC EQUIV) 0.3 MG/0.3ML injection 2-pack Inject 0.3 mLs (0.3 mg) into the muscle as needed for anaphylaxis May repeat one time in 5-15 minutes if response to initial dose is inadequate. 2 each 0       Allergies   Allergen Reactions     Animal Dander Swelling and Rash     Minimal reaction when taking antihistamines     Banana Swelling     Pineapple Swelling     Shrimp Swelling       No family history on file.    Additional medical/Social/Surgical histories reviewed in [facility] and updated as appropriate.       PHYSICAL EXAM  Ht 1.829 m (6')   Wt  72.6 kg (160 lb)   BMI 21.70 kg/m      General  - normal appearance, in no obvious distress  Musculoskeletal - Left ankle  - stance: gait favors affected side, reluctant to bear weight  - inspection: mild swelling laterally, normal bone and joint alignment, no obvious deformity  - palpation: tenderness over posteromedial tibia, no tenderness over lateral or medial malleoli, navicular, or base of 5th MT.Tender ATFL, CFL region at lateral ankle.  -Right ankle tenderness at distal achilles and at kager's fat pad.  - ROM: Deferred  - strength: able to move all toes freely  Neuro  - no sensory or motor deficit, grossly normal coordination, normal muscle tone  Skin  - ecchymosis overlying lateral foot-ankle junction, no warmth or induration, no obvious rash  Psych  - interactive, appropriate, normal mood and affect    IMAGING : XR left ankle 3 views. Final results and radiologist's interpretation, available in the [facility] health record. Images were reviewed with the patient/family members in the office today. My personal interpretation of the performed imaging is decreased fracture lucency with early healing seen of posterior malleolar fracture.    Reviewed Dr. De La Rosa note from 2/14/23, 2/7/23 PA-C notes.  XR from 2/7 and 2/14, 2/28 independently interpreted.    ASSESSMENT & PLAN  Ms. Cazares is a 23 year old year old female who presents to clinic today for repeat imaging and evaluation of posterior malleolar fracture of left ankle.    Diagnosis:   (S82.302D) Closed fracture of distal end of left tibia with routine healing, unspecified fracture morphology, subsequent encounter  (primary encounter diagnosis)  Achilles tendinitis of right ankle.    -Continue non-weightbearing with crutches  -Tall CAM boot at all times except hygiene  -Right achilles HEP provided for suspected tendinitis due to overuse  -Follow up 3 weeks with repeat xrays.  Suspected transition to weightbearing and early ROM at that time.  Follow up with   Rj or myself.    It was a pleasure seeing Fabby today.    Chivo Tomlinson DO, CAM  Primary Care Sports Medicine

## 2023-02-28 NOTE — LETTER
2/28/2023    RE: Fabby Cazares  [address] [facility] [address]     Dear Colleague,    Thank you for referring your patient, Fabby Cazares, to the [facility] [place]. Please see a copy of my visit note below.    CHIEF COMPLAINT:  RECHECK (Left ankle fx)       HISTORY OF PRESENT ILLNESS  Ms. Cazares is a pleasant 23 year old year old female who presents to clinic today with a left ankle fracture.  Fabby explains that she slipped on ice and injured her left ankle.    Onset: sudden  Location: left ankle  Quality:  dull  Duration: 2/7/23  Severity: 8/10 at worst  Timing:constant  Modifying factors:  resting and non-use makes it better, movement and use makes it worse  Associated signs & symptoms: pain, numbness and tingling on top of foot, swelling  Previous similar pain: No  Treatments to date: Tylenol, cam boot, crutches, compression sock, ice    Additional history: as documented    Review of Systems:     Have you recently had a a fever, chills, weight loss? Yes, weight loss    Do you have any vision problems? No    Do you have any chest pain or edema? No    Do you have any shortness of breath or wheezing?  No    Do you have stomach problems? No    Do you have any numbness or focal weakness? Yes, right arm occasionally    Do you have diabetes? No    Do you have problems with bleeding or clotting? Yes, blood thinners previously and previous DVT after surgery 8 years ago    Do you have an rashes or other skin lesions? No    MEDICAL HISTORY  There is no problem list on file for this patient.      Current Outpatient Medications   Medication Sig Dispense Refill     EPINEPHrine (ANY BX GENERIC EQUIV) 0.3 MG/0.3ML injection 2-pack Inject 0.3 mLs (0.3 mg) into the muscle as needed for anaphylaxis May repeat one time in 5-15 minutes if response to initial dose is inadequate. 2 each 0       Allergies   Allergen Reactions     Animal Dander Swelling and Rash     Minimal reaction when  taking antihistamines     Banana Swelling     Pineapple Swelling     Shrimp Swelling       No family history on file.    Additional medical/Social/Surgical histories reviewed in [facility] and updated as appropriate.       PHYSICAL EXAM  Ht 1.829 m (6')   Wt 72.6 kg (160 lb)   BMI 21.70 kg/m      General  - normal appearance, in no obvious distress  Musculoskeletal - Left ankle  - stance: gait favors affected side, reluctant to bear weight  - inspection: mild swelling laterally, normal bone and joint alignment, no obvious deformity  - palpation: tenderness over posteromedial tibia, no tenderness over lateral or medial malleoli, navicular, or base of 5th MT.Tender ATFL, CFL region at lateral ankle.  -Right ankle tenderness at distal achilles and at kager's fat pad.  - ROM: Deferred  - strength: able to move all toes freely  Neuro  - no sensory or motor deficit, grossly normal coordination, normal muscle tone  Skin  - ecchymosis overlying lateral foot-ankle junction, no warmth or induration, no obvious rash  Psych  - interactive, appropriate, normal mood and affect    IMAGING : XR left ankle 3 views. Final results and radiologist's interpretation, available in the [facility] health record. Images were reviewed with the patient/family members in the office today. My personal interpretation of the performed imaging is decreased fracture lucency with early healing seen of posterior malleolar fracture.    Reviewed Dr. De La Rosa note from 2/14/23, 2/7/23 PA-C notes.  XR from 2/7 and 2/14, 2/28 independently interpreted.    ASSESSMENT & PLAN  Ms. Cazares is a 23 year old year old female who presents to clinic today for repeat imaging and evaluation of posterior malleolar fracture of left ankle.    Diagnosis:   (S82.302D) Closed fracture of distal end of left tibia with routine healing, unspecified fracture morphology, subsequent encounter  (primary encounter diagnosis)  Achilles tendinitis of right ankle.    -Continue non-weightbearing  with crutches  -Tall CAM boot at all times except hygiene  -Right achilles HEP provided for suspected tendinitis due to overuse  -Follow up 3 weeks with repeat xrays.  Suspected transition to weightbearing and early ROM at that time.  Follow up with Dr. De La Rosa or myself.    It was a pleasure seeing Fabby today.    Chivo Tomlinson DO, CAQSM  Primary Care Sports Medicine

## 2023-03-20 DIAGNOSIS — S82.302D CLOSED FRACTURE OF DISTAL END OF LEFT TIBIA WITH ROUTINE HEALING, UNSPECIFIED FRACTURE MORPHOLOGY, SUBSEQUENT ENCOUNTER: Primary | ICD-10-CM

## 2023-03-21 ENCOUNTER — ANCILLARY PROCEDURE (OUTPATIENT)
Dept: GENERAL RADIOLOGY | Facility: CLINIC | Age: 23
End: 2023-03-21
Attending: FAMILY MEDICINE
Payer: COMMERCIAL

## 2023-03-21 ENCOUNTER — OFFICE VISIT (OUTPATIENT)
Dept: ORTHOPEDICS | Facility: CLINIC | Age: 23
End: 2023-03-21
Payer: COMMERCIAL

## 2023-03-21 DIAGNOSIS — S82.302D CLOSED FRACTURE OF DISTAL END OF LEFT TIBIA WITH ROUTINE HEALING, UNSPECIFIED FRACTURE MORPHOLOGY, SUBSEQUENT ENCOUNTER: Primary | ICD-10-CM

## 2023-03-21 DIAGNOSIS — S82.302D CLOSED FRACTURE OF DISTAL END OF LEFT TIBIA WITH ROUTINE HEALING, UNSPECIFIED FRACTURE MORPHOLOGY, SUBSEQUENT ENCOUNTER: ICD-10-CM

## 2023-03-21 PROCEDURE — 99213 OFFICE O/P EST LOW 20 MIN: CPT | Performed by: FAMILY MEDICINE

## 2023-03-21 PROCEDURE — 73610 X-RAY EXAM OF ANKLE: CPT | Mod: LT | Performed by: RADIOLOGY

## 2023-03-21 NOTE — PROGRESS NOTES
ESTABLISHED PATIENT FOLLOW-UP:  Follow Up of the Left Ankle       HISTORY OF PRESENT ILLNESS  Ms. Sequeira is a pleasant 23 year old year old female who presents to clinic today for follow-up of left ankle pain.    Date of injury: 2/7/23  Date last seen: 2/28/23  Following Therapeutic Plan: Walking boot and nonweightbearing    Pain: 2-6/10  Function: Nonweightbearing out of home, does use boot and WB with no pain in her apartment.  Interval History: Pain at the end of the day is mild. No pain with WB in boot at home. No pain with NWB.      Patient has been nonweightbearing most of the time except in her apartment.  She has had no pain with weightbearing in the boot in her apartment.  Swelling towards the end of the day.  She does have areas of tingling at times in the first second and third digits.    Additional medical/Social/Surgical histories reviewed in [facility] and updated as appropriate.    REVIEW OF SYSTEMS (3/21/2023)  CONSTITUTIONAL: Denies fever and weight loss  GASTROINTESTINAL: Denies abdominal pain, nausea, vomiting  MUSCULOSKELETAL: See HPI  SKIN: Denies any recent rash or lesion  NEUROLOGICAL: Denies numbness or focal weakness     PHYSICAL EXAM  There were no vitals taken for this visit.    General  - normal appearance, in no obvious distress  Musculoskeletal - Left ankle  - stance: Minimally antalgic gait weightbearing in the boot today.  - inspection: Resolved swelling, normal bone and joint alignment, no obvious deformity  - palpation: Nontender posteromedial tibia, no tenderness over lateral or medial malleoli, navicular, or base of 5th MT.Tender ATFL, CFL region at lateral ankle. Right ankle tenderness at distal achilles and at kager's fat pad.  Nontender to palpation at proximal or mid fibula.  - ROM: Decreased plantarflexion, dorsiflexion to neutral, inversion and eversion relatively painless and slightly decreased  - strength: 4/5 inversion, eversion dorsiflexion and plantarflexion without  pain  Neuro  - no sensory or motor deficit, grossly normal coordination, normal muscle tone  Skin  -Resolved ecchymosis no warmth or induration, no obvious rash  Psych  - interactive, appropriate, normal mood and affect    IMAGING : X-ray left ankle 3 view. Final results and radiologist's interpretation, available in the [facility] health record. Images were reviewed with the patient/family members in the office today. My personal interpretation of the performed imaging is near complete healing of posterior malleolar fracture.     ASSESSMENT & PLAN  Ms. Sequeira is a 23 year old year old female who presents to clinic today with Follow Up of the Left Ankle    Diagnosis:  Posterior malleolar fracture of left ankle, with routine healing    At this time I would like her to transition to weightbearing as tolerated in the boot.  We discussed progression from the boot into ankle brace in roughly 10 days if going well weightbearing.  Progressive strengthening and range of motion to continue starting today.  DME for ankle brace dispensed.  I have provided a Thera-Band as well as an exercise program she can follow along with.  I did recommend formal physical therapy, but she is concerned about the co-pays and wishes to start with HEP first.  I think this is very reasonable at this juncture unless debility does persist over the next 4 to 6 weeks. Ankle sleeve discussed and can be purchased OTC.   Follow-up 4 to 6 weeks as needed if pain, debility, difficulty weaning boot or brace, or tingling persists.    It was a pleasure seeing Debora.    Chivo Tomlinson DO, CAM  Primary Care Sports Medicine

## 2023-03-21 NOTE — LETTER
3/21/2023      RE: Debora Sequeira  [address] [facility] [address]     Dear Colleague,    Thank you for referring your patient, Debora Sequeira, to the [facility] [place]. Please see a copy of my visit note below.    ESTABLISHED PATIENT FOLLOW-UP:  Follow Up of the Left Ankle       HISTORY OF PRESENT ILLNESS  Ms. Sequeira is a pleasant 23 year old year old female who presents to clinic today for follow-up of left ankle pain.    Date of injury: 2/7/23  Date last seen: 2/28/23  Following Therapeutic Plan: Walking boot and nonweightbearing    Pain: 2-6/10  Function: Nonweightbearing out of home, does use boot and WB with no pain in her apartment.  Interval History: Pain at the end of the day is mild. No pain with WB in boot at home. No pain with NWB.      Patient has been nonweightbearing most of the time except in her apartment.  She has had no pain with weightbearing in the boot in her apartment.  Swelling towards the end of the day.  She does have areas of tingling at times in the first second and third digits.    Additional medical/Social/Surgical histories reviewed in [facility] and updated as appropriate.    REVIEW OF SYSTEMS (3/21/2023)  CONSTITUTIONAL: Denies fever and weight loss  GASTROINTESTINAL: Denies abdominal pain, nausea, vomiting  MUSCULOSKELETAL: See HPI  SKIN: Denies any recent rash or lesion  NEUROLOGICAL: Denies numbness or focal weakness     PHYSICAL EXAM  There were no vitals taken for this visit.    General  - normal appearance, in no obvious distress  Musculoskeletal - Left ankle  - stance: Minimally antalgic gait weightbearing in the boot today.  - inspection: Resolved swelling, normal bone and joint alignment, no obvious deformity  - palpation: Nontender posteromedial tibia, no tenderness over lateral or medial malleoli, navicular, or base of 5th MT.Tender ATFL, CFL region at lateral ankle. Right ankle tenderness at distal achilles and at kager's fat pad.   Nontender to palpation at proximal or mid fibula.  - ROM: Decreased plantarflexion, dorsiflexion to neutral, inversion and eversion relatively painless and slightly decreased  - strength: 4/5 inversion, eversion dorsiflexion and plantarflexion without pain  Neuro  - no sensory or motor deficit, grossly normal coordination, normal muscle tone  Skin  -Resolved ecchymosis no warmth or induration, no obvious rash  Psych  - interactive, appropriate, normal mood and affect    IMAGING : X-ray left ankle 3 view. Final results and radiologist's interpretation, available in the [facility] health record. Images were reviewed with the patient/family members in the office today. My personal interpretation of the performed imaging is near complete healing of posterior malleolar fracture.     ASSESSMENT & PLAN  Ms. Sequeira is a 23 year old year old female who presents to clinic today with Follow Up of the Left Ankle    Diagnosis:  Posterior malleolar fracture of left ankle, with routine healing    At this time I would like her to transition to weightbearing as tolerated in the boot.  We discussed progression from the boot into ankle brace in roughly 10 days if going well weightbearing.  Progressive strengthening and range of motion to continue starting today.  DME for ankle brace dispensed.  I have provided a Thera-Band as well as an exercise program she can follow along with.  I did recommend formal physical therapy, but she is concerned about the co-pays and wishes to start with HEP first.  I think this is very reasonable at this juncture unless debility does persist over the next 4 to 6 weeks. Ankle sleeve discussed and can be purchased OTC.   Follow-up 4 to 6 weeks as needed if pain, debility, difficulty weaning boot or brace, or tingling persists.    It was a pleasure seeing Debora.    Chivo Tomlinson DO, [facility]  Primary Care Sports Medicine          Again, thank you for allowing me to participate in the care of your patient.       Sincerely,    Chivo Tomlinson, DO

## 2023-03-21 NOTE — LETTER
Date:March 22, 2023      Patient was self referred, no letter generated. Do not send.        [facility] Health Information

## 2023-03-21 NOTE — PATIENT INSTRUCTIONS
Start transitioning to total weight bearing - out of house.  After 7-10 days, try ankle brace in house and boot when leaving  After 5-7 days, if going well ankle brace at all times.    Strengthening starting today every day, twice daily.    Strengthening therapy  -Ice 10-15 minutes after activity. (or Ice bath 5-7min)  -often hip and ankle weakness leads to lower extremity (foot, ankle, shin, and knee) problems so a lot of focus will be on core strength and balance  - recommend yoga for core strengthening and stretching  -Perform exercises as instructed through handout or formal therapy if doing. Until then start with the following:  -ankle strengthening (additional below)   1) balance on one foot 1-2 min daily (perform on both feet)   2) arch raises- tighten bottom of foot like trying to shorten foot and hold x 3-5  sec, repeat 5 times   3) ankle exercises (4 way with theraband)- 3 sets of 10-15 (fatigue) daily   5) heel raises on step-- once painfree lowering on both, start to slowly lower on  one foot    Return to activity guidelines:  -If it hurts, do not do it!  -wear ankle brace until pain free with full activity and exercises for at least 6 weeks  -Must meet each goal before return to play:   1) painfree jogging straight line   2) pain free sprints   3) pain free cutting/ changing directions      Ankle Sprain Exercises    As soon as it doesn t hurt too much to put pressure on the ball of your foot, start stretching your ankle using the towel stretch. When this stretch is easy, try the other exercises.    Towel stretch: Sit on a hard surface with your injured leg stretched out in front of you. Loop a towel around your toes and the ball of your foot and pull the towel toward your body keeping your leg straight. Hold this position for 15 to 30 seconds and then relax. Repeat 3 times.    Standing calf stretch: Stand facing a wall with your hands on the wall at about eye level. Keep your injured leg back with your  heel on the floor. Keep the other leg forward with the knee bent. Turn your back foot slightly inward (as if you were pigeon-toed). Slowly lean into the wall until you feel a stretch in the back of your calf. Hold the stretch for 15 to 30 seconds. Return to the starting position. Repeat 3 times. Do this exercise several times each day.    Standing soleus stretch: Stand facing a wall with your hands on the wall at about chest height. Keep your injured leg back with your heel on the floor. Keep the other leg forward with the knee bent. Turn your back foot slightly inward (as if you were pigeon-toed). Bend your back knee slightly and gently lean into the wall until you feel a stretch in the lower calf of your injured leg. Hold the stretch for 15 to 30 seconds. Return to the starting position. Repeat 3 times.    Ankle range of motion: Sit or lie down with your legs straight and your knees pointing toward the ceiling. Point your toes on your injured side toward your nose, then away from your body. Point your toes in toward your other foot and then out away from your other foot. Finally, move the top of your foot in circles. Move only your foot and ankle. Don't move your leg. Repeat 10 times in each direction. Push hard in all directions.  Resisted ankle dorsiflexion: Tie a knot in one end of the elastic tubing and shut the knot in a door. Tie a loop in the other end of the tubing and put the foot on your injured side through the loop so that the tubing goes around the top of the foot. Sit facing the door with your injured leg straight out in front of you. Move away from the door until there is tension in the tubing. Keeping your leg straight, pull the top of your foot toward your body, stretching the tubing. Slowly return to the starting position. Do 2 sets of 15.  Resisted ankle plantar flexion: Sit with your injured leg stretched out in front of you. Loop the tubing around the ball of your foot. Hold the ends of the  tubing with both hands. Gently press the ball of your foot down and point your toes, stretching the tubing. Return to the starting position. Do 2 sets of 15.    Resisted ankle inversion: Sit with your legs stretched out in front of you. Cross the ankle of your uninjured leg over your other ankle. Wrap elastic tubing around the ball of the foot of your injured leg and then loop it around your other foot so that the tubing is anchored there at one end. Hold the other end of the tubing in your hand. Turn the foot of your injured leg inward and upward. This will stretch the tubing. Return to the starting position. Do 2 sets of 15.    Resisted ankle eversion: Sit with both legs stretched out in front of you, with your feet about a shoulder's width apart. Tie a loop in one end of elastic tubing. Put the foot of your injured leg through the loop so that the tubing goes around the arch of that foot and wraps around the outside of the other foot. Hold onto the other end of the tubing with your hand to provide tension. Turn the foot of your injured leg up and out. Make sure you keep your other foot still so that it will allow the tubing to stretch as you move the foot of your injured leg. Return to the starting position. Do 2 sets of 15.  You may do the following exercises when you can stand on your injured ankle without pain.    Heel raise: Stand behind a chair or counter with both feet flat on the floor. Using the chair or counter as a support, rise up onto your toes and hold for 5 seconds. Then slowly lower yourself down without holding onto the support. (It's OK to keep holding onto the support if you need to.) When this exercise becomes less painful, try doing this exercise while you are standing on the injured leg only. Repeat 15 times. Do 2 sets of 15. Rest 30 seconds between sets.    Step-up: Stand with the foot of your injured leg on a support 3 to 5 inches (8 to 13 centimeters) high --like a small step or block of  wood. Keep your other foot flat on the floor. Shift your weight onto the injured leg on the support. Straighten your injured leg as the other leg comes off the floor. Return to the starting position by bending your injured leg and slowly lowering your uninjured leg back to the floor. Do 2 sets of 15.    Balance and reach exercises: Stand next to a chair with your injured leg farther from the chair. The chair will provide support if you need it. Stand on the foot of your injured leg and bend your knee slightly. Try to raise the arch of this foot while keeping your big toe on the floor. Keep your foot in this position.    With the hand that is farther away from the chair, reach forward in front of you by bending at the waist. Avoid bending your knee any more as you do this. Repeat this 15 times. To make the exercise more challenging, reach farther in front of you. Do 2 sets of 15.    While keeping your arch raised, reach the hand that is farther away from the chair across your body toward the chair. The farther you reach, the more challenging the exercise. Do 2 sets of 15.    Side-lying leg lift: Lie on your uninjured side. Tighten the front thigh muscles on your injured leg and lift that leg 8 to 10 inches (20 to 25 centimeters) away from the other leg. Keep the leg straight and lower it slowly. Do 2 sets of 15.  If you have access to a wobble board, do the following exercises:    Wobble board exercises  Stand on a wobble board with your feet shoulder-width apart.    Rock the board forwards and backwards 30 times, then side to side 30 times. Hold on to a chair if you need support.  Rotate the wobble board around so that the edge of the board is in contact with the floor at all times. Do this 30 times in a clockwise and then a counterclockwise direction.  Balance on the wobble board for as long as you can without letting the edges touch the floor. Try to do this for 2 minutes without touching the floor.  Rotate the  wobble board in clockwise and counterclockwise circles, but do not let the edge of the board touch the floor.  When you have mastered the wobble exercises standing on both legs, try repeating them while standing on just your injured leg. After you are able to do these exercises on one leg, try to do them with your eyes closed. Make sure you have something nearby to support you in case you lose your balance.    Developed by [employer].  Published by [employer].  Copyright  2014 [employer] and/or one of its subsidiaries. All rights reserved.

## 2023-07-22 PROCEDURE — 99283 EMERGENCY DEPT VISIT LOW MDM: CPT | Performed by: EMERGENCY MEDICINE

## 2023-07-23 ENCOUNTER — HOSPITAL ENCOUNTER (EMERGENCY)
Facility: CLINIC | Age: 23
Discharge: HOME | End: 2023-07-23
Attending: EMERGENCY MEDICINE | Admitting: EMERGENCY MEDICINE
Payer: COMMERCIAL

## 2023-07-23 VITALS
WEIGHT: 160 LBS | HEART RATE: 107 BPM | SYSTOLIC BLOOD PRESSURE: 164 MMHG | BODY MASS INDEX: 21.67 KG/M2 | TEMPERATURE: 97.9 F | DIASTOLIC BLOOD PRESSURE: 82 MMHG | OXYGEN SATURATION: 100 % | HEIGHT: 72 IN | RESPIRATION RATE: 20 BRPM

## 2023-07-23 DIAGNOSIS — S01.85XA DOG BITE OF FACE, INITIAL ENCOUNTER: ICD-10-CM
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
## 2023-07-23 PROCEDURE — 250N000013 HC RX MED GY IP 250 OP 250 PS 637: Performed by: EMERGENCY MEDICINE

## 2023-07-23 RX ORDER — ACETAMINOPHEN 500 MG
1000 TABLET ORAL ONCE
Status: COMPLETED | OUTPATIENT
Start: 2023-07-23 | End: 2023-07-23

## 2023-07-23 RX ORDER — IBUPROFEN 600 MG/1
600 TABLET, FILM COATED ORAL ONCE
Status: COMPLETED | OUTPATIENT
Start: 2023-07-23 | End: 2023-07-23

## 2023-07-23 RX ADMIN — AMOXICILLIN AND CLAVULANATE POTASSIUM 1 TABLET: 875; 125 TABLET, FILM COATED ORAL at 00:36

## 2023-07-23 RX ADMIN — ACETAMINOPHEN 1000 MG: 500 TABLET ORAL at 00:44

## 2023-07-23 RX ADMIN — IBUPROFEN 600 MG: 600 TABLET, FILM COATED ORAL at 00:44

## 2023-07-23 ASSESSMENT — ACTIVITIES OF DAILY LIVING (ADL): ADLS_ACUITY_SCORE: 35
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
## 2023-07-23 NOTE — DISCHARGE INSTRUCTIONS
Instructions from your doctor today:  Emergency Department (ED) testing is focused on the potential causes of your symptoms that are the most dangerous possibilities, and cannot cover every possibility. Based on the evaluation, it was deemed sufficiently safe to discharge and continue management through the clinics. Thus, follow-up is very important to assess for improvement/worsening, potential further testing, and potential treatment adjustments. If you were given opioid pain medications or other medications that can make you drowsy while in the ED, you should not drive for at least several hours and not until you feel completely back to normal.     Please make an appointment to follow up with:  - Your Primary Care Provider in 4-6 days for wound recheck  - If you do not have a primary care provider, you can be seen in follow-up and establish care by calling any of the clinics below:     - [facility] (phone: [phone])     - Primary Care / [facility] (phone: [phone])   - Have your clinic provider review the results from today's visit with you again, including any potential follow-up or additional testing that may be needed based on the results. Occasionally, incidental findings are found on later review by radiologists that may need follow-up.     Return to the Emergency Department immediately if you have spreading redness/swelling/warmth/pus (signs of infection), or any other urgent health concerns.

## 2024-03-10 ENCOUNTER — HEALTH MAINTENANCE LETTER (OUTPATIENT)
Age: 24
End: 2024-03-10

## 2025-03-16 ENCOUNTER — HEALTH MAINTENANCE LETTER (OUTPATIENT)
Age: 25
End: 2025-03-16